# Patient Record
Sex: MALE | Race: WHITE | Employment: OTHER | URBAN - METROPOLITAN AREA
[De-identification: names, ages, dates, MRNs, and addresses within clinical notes are randomized per-mention and may not be internally consistent; named-entity substitution may affect disease eponyms.]

---

## 2017-06-16 ENCOUNTER — HOSPITAL ENCOUNTER (OUTPATIENT)
Dept: PREADMISSION TESTING | Age: 72
Discharge: HOME | End: 2017-06-16
Payer: MEDICARE

## 2017-06-16 DIAGNOSIS — S83.242A ACUTE MEDIAL MENISCUS TEAR OF LEFT KNEE: ICD-10-CM

## 2017-06-16 LAB
ALBUMIN SERPL BCP-MCNC: 4.1 G/DL (ref 3.4–5)
ALBUMIN/GLOB SERPL: 1.1 {RATIO} (ref 0.8–1.7)
ALP SERPL-CCNC: 73 U/L (ref 45–117)
ALT SERPL-CCNC: 21 U/L (ref 16–61)
ANION GAP BLD CALC-SCNC: 10 MMOL/L (ref 3–18)
AST SERPL W P-5'-P-CCNC: 22 U/L (ref 15–37)
BASOPHILS # BLD AUTO: 0 K/UL (ref 0–0.06)
BASOPHILS # BLD: 0 % (ref 0–2)
BILIRUB SERPL-MCNC: 0.6 MG/DL (ref 0.2–1)
BUN SERPL-MCNC: 14 MG/DL (ref 7–18)
BUN/CREAT SERPL: 16 (ref 12–20)
CALCIUM SERPL-MCNC: 8.7 MG/DL (ref 8.5–10.1)
CHLORIDE SERPL-SCNC: 102 MMOL/L (ref 100–108)
CO2 SERPL-SCNC: 28 MMOL/L (ref 21–32)
CREAT SERPL-MCNC: 0.89 MG/DL (ref 0.6–1.3)
DIFFERENTIAL METHOD BLD: ABNORMAL
EOSINOPHIL # BLD: 0.1 K/UL (ref 0–0.4)
EOSINOPHIL NFR BLD: 3 % (ref 0–5)
ERYTHROCYTE [DISTWIDTH] IN BLOOD BY AUTOMATED COUNT: 13.4 % (ref 11.6–14.5)
GLOBULIN SER CALC-MCNC: 3.6 G/DL (ref 2–4)
GLUCOSE SERPL-MCNC: 98 MG/DL (ref 74–99)
HCT VFR BLD AUTO: 39.4 % (ref 36–48)
HGB BLD-MCNC: 13.2 G/DL (ref 13–16)
LYMPHOCYTES # BLD AUTO: 26 % (ref 21–52)
LYMPHOCYTES # BLD: 1.3 K/UL (ref 0.9–3.6)
MCH RBC QN AUTO: 30.8 PG (ref 24–34)
MCHC RBC AUTO-ENTMCNC: 33.5 G/DL (ref 31–37)
MCV RBC AUTO: 92.1 FL (ref 74–97)
MONOCYTES # BLD: 0.7 K/UL (ref 0.05–1.2)
MONOCYTES NFR BLD AUTO: 15 % (ref 3–10)
NEUTS SEG # BLD: 2.8 K/UL (ref 1.8–8)
NEUTS SEG NFR BLD AUTO: 56 % (ref 40–73)
PLATELET # BLD AUTO: 208 K/UL (ref 135–420)
PMV BLD AUTO: 9.7 FL (ref 9.2–11.8)
POTASSIUM SERPL-SCNC: 4.2 MMOL/L (ref 3.5–5.5)
PROT SERPL-MCNC: 7.7 G/DL (ref 6.4–8.2)
RBC # BLD AUTO: 4.28 M/UL (ref 4.7–5.5)
SODIUM SERPL-SCNC: 140 MMOL/L (ref 136–145)
WBC # BLD AUTO: 5 K/UL (ref 4.6–13.2)

## 2017-06-16 PROCEDURE — 85025 COMPLETE CBC W/AUTO DIFF WBC: CPT | Performed by: ORTHOPAEDIC SURGERY

## 2017-06-16 PROCEDURE — 36415 COLL VENOUS BLD VENIPUNCTURE: CPT | Performed by: ORTHOPAEDIC SURGERY

## 2017-06-16 PROCEDURE — 80053 COMPREHEN METABOLIC PANEL: CPT | Performed by: ORTHOPAEDIC SURGERY

## 2017-06-16 PROCEDURE — 93005 ELECTROCARDIOGRAM TRACING: CPT

## 2017-06-17 LAB
ATRIAL RATE: 49 BPM
CALCULATED P AXIS, ECG09: 45 DEGREES
CALCULATED R AXIS, ECG10: 15 DEGREES
CALCULATED T AXIS, ECG11: 10 DEGREES
DIAGNOSIS, 93000: NORMAL
P-R INTERVAL, ECG05: 158 MS
Q-T INTERVAL, ECG07: 456 MS
QRS DURATION, ECG06: 106 MS
QTC CALCULATION (BEZET), ECG08: 411 MS
VENTRICULAR RATE, ECG03: 49 BPM

## 2017-07-19 ENCOUNTER — TELEPHONE (OUTPATIENT)
Dept: HEMATOLOGY | Age: 72
End: 2017-07-19

## 2017-07-19 NOTE — TELEPHONE ENCOUNTER
Informed Amanda Polo that I will send a message to Dr. Celso Arteaga and his nurse please give her a call back at earliest convenience to [phone].

## 2017-08-01 DIAGNOSIS — Q44.4 CHOLEDOCHAL CYST: Primary | ICD-10-CM

## 2017-08-09 ENCOUNTER — HOSPITAL ENCOUNTER (OUTPATIENT)
Dept: NUCLEAR MEDICINE | Age: 72
Discharge: HOME | End: 2017-08-09
Attending: INTERNAL MEDICINE
Payer: MEDICARE

## 2017-08-09 ENCOUNTER — HOSPITAL ENCOUNTER (OUTPATIENT)
Dept: MRI IMAGING | Age: 72
Discharge: HOME | End: 2017-08-09
Attending: INTERNAL MEDICINE
Payer: MEDICARE

## 2017-08-09 DIAGNOSIS — Q44.4 CHOLEDOCHAL CYST: ICD-10-CM

## 2017-08-09 LAB — CREAT UR-MCNC: 0.8 MG/DL (ref 0.6–1.3)

## 2017-08-09 PROCEDURE — A9585 GADOBUTROL INJECTION: HCPCS | Performed by: INTERNAL MEDICINE

## 2017-08-09 PROCEDURE — 78226 HEPATOBILIARY SYSTEM IMAGING: CPT

## 2017-08-09 PROCEDURE — 74183 MRI ABD W/O CNTR FLWD CNTR: CPT

## 2017-08-09 PROCEDURE — 82565 ASSAY OF CREATININE: CPT

## 2017-08-09 PROCEDURE — 74011250636 HC RX REV CODE- 250/636: Performed by: INTERNAL MEDICINE

## 2017-08-09 RX ADMIN — GADOBUTROL 10 ML: 604.72 INJECTION INTRAVENOUS at 10:22

## 2017-09-05 ENCOUNTER — OFFICE VISIT (OUTPATIENT)
Dept: HEMATOLOGY | Age: 72
End: 2017-09-05

## 2017-09-05 VITALS
DIASTOLIC BLOOD PRESSURE: 71 MMHG | TEMPERATURE: 98.5 F | BODY MASS INDEX: 25.22 KG/M2 | WEIGHT: 180.13 LBS | SYSTOLIC BLOOD PRESSURE: 120 MMHG | HEART RATE: 69 BPM | HEIGHT: 71 IN | OXYGEN SATURATION: 99 %

## 2017-09-05 DIAGNOSIS — K76.89 LIVER CYST: Primary | ICD-10-CM

## 2017-09-05 RX ORDER — LANOLIN ALCOHOL/MO/W.PET/CERES
1000 CREAM (GRAM) TOPICAL DAILY
COMMUNITY

## 2017-09-05 RX ORDER — RIVASTIGMINE TARTRATE 1.5 MG/1
CAPSULE ORAL 2 TIMES DAILY WITH MEALS
COMMUNITY

## 2017-09-05 NOTE — PROGRESS NOTES
[address] Urias MD, [address], [place], [place]       Bautista Gates, JOANN Paulino, [facility]   Mike Arevalo, MICHEL Colin NP        at [address], [address] [address]     [phone]     FAX: [phone]    at [address], [id number] [place] [address], [address]     [phone]     FAX: [phone]       Patient Care Team:  Iván Sevilla PA-C as PCP - General (Physician Assistant)  Olman Keating MD (Gastroenterology)  Tova Antonio MD (Urology)      Problem List  Date Reviewed: 12/9/2017          Codes Class Noted    Liver cyst ICD-10-CM: K76.89  ICD-9-CM: 573.8  12/9/2017        Gallstones ICD-10-CM: K80.20  ICD-9-CM: 574.20  7/19/2014        BPH (benign prostatic hyperplasia) ICD-10-CM: N40.0  ICD-9-CM: 600.00  7/16/2014        Bile duct abnormality ICD-10-CM: K83.9  ICD-9-CM: 576.9  7/16/2014    Overview Signed 4/4/2015  8:56 AM by Zelda Berry MD     Liver cyst adjacent to bile duct or intrahepatic single choledococyst             Hypercholesterolemia ICD-10-CM: E78.00  ICD-9-CM: 272.0  7/16/2014        Hypertension ICD-10-CM: I10  ICD-9-CM: 401.9  7/16/2014        H/O shoulder surgery ICD-10-CM: Z98.890  ICD-9-CM: V45.89  7/16/2014              Rianna Crew returns to the [address] for management of an acute single elevation in liver enzymes and a liver cyst.  The active problem list, all pertinent past medical history, medications, endoscopic studies, radiologic findings and laboratory findings related to the liver disorder were reviewed with the patient. The patient is a 71 y.o.  male who was first noted to have an elevation in liver enzymes in 5/2014. Serologic evaluation for various causes of chronic liver disease were either normal or negative.     An MRI/MRCP of the liver was performed in 1/2015. This demonstrated a small simple cyst adjacent to a bile duct or a small single choledochocyst.  The CBD was mildly dilated. A repeat MRI in 1/2016 was identical.    The patient had another MRI in 7/2016. This demosntrated a similar appearance as was seen on the previous 2 imaging studies. A liver biopsy has not been performed and is not indicated. The most recent laboratory studies indicate that the liver transaminases are normal, ALP is normal, tests of hepatic synthetic and metabolic function are normal, and the platelet count is normal.    The patient feels well and voiced no complaints today. The patient completes all daily activities without any functional limitations. The patient has not experienced fatigue, fevers, chills, pain in the right side over the liver, nausea, arthralgias, myalgias. ALLERGIES  Allergies   Allergen Reactions    Darvon [Propoxyphene] Shortness of Breath    Morphine (Pf) Other (comments)     Lowered  blood pressure       MEDICATIONS  Current Outpatient Prescriptions   Medication Sig    rivastigmine tartrate (EXELON) 1.5 mg capsule Take  by mouth two (2) times daily (with meals).  cyanocobalamin 1,000 mcg tablet Take 1,000 mcg by mouth daily.  pitavastatin (LIVALO) 4 mg tab tablet Take 4 mg by mouth daily.  OTHER Taty 0.5/0.4mg for enlarged prostate  Indications: DISORDER OF PROSTATE    ezetimibe (ZETIA) 10 mg tablet Take  by mouth.  omeprazole (PRILOSEC) 20 mg capsule Take 20 mg by mouth daily.  ALPRAZOLAM (XANAX PO) Take  by mouth.  FOLIC ACID/MULTIVITS-MIN/LUT (CENTRUM SILVER PO) Take  by mouth.  Omega-3 Fatty Acids-Fish Oil (OMEGA 3 FISH OIL) 684-1,200 mg cpDR Take  by mouth.  coenzyme q10-vitamin e (COQ10 ) 100-100 mg-unit cap Take  by mouth.  rosuvastatin (CRESTOR) 5 mg tablet Take  by mouth nightly.  losartan (COZAAR) 50 mg tablet Take 25 mg by mouth daily.     aspirin delayed-release 81 mg tablet Take  by mouth daily. No current facility-administered medications for this visit. SYSTEM REVIEW NOT RELATED TO LIVER DISEASE OR REVIEWED ABOVE:  Constitution systems: Negative for fever, chills, weight gain, weight loss. Eyes: Negative for visual changes. ENT: Negative for sore throat, painful swallowing. Respiratory: Negative for cough, hemoptysis, SOB. Cardiology: Negative for chest pain, palpitations. GI:  Negative for constipation or diarrhea. : Negative for urinary frequency, dysuria, hematuria, nocturia. Skin: Negative for rash. Hematology: Negative for easy bruising, blood clots. Musculo-skelatal: Negative for back pain, muscle pain, weakness. Neurologic: Negative for headaches, dizziness, vertigo, memory problems not related to HE. Psychology: Negative for anxiety, depression. FAMILY HISTORY:  There is no family history of liver disease. SOCIAL HISTORY:  The patient is . The patient has 1 child. The patient stopped using tobacco products in 1975. The patient has never consumed significant amounts of alcohol. The patient currently works full time . PHYSICAL EXAMINATION:  Visit Vitals    /71    Pulse 69    Temp 98.5 °F (36.9 °C) (Tympanic)    Ht 5' 11\" (1.803 m)    Wt 180 lb 2 oz (81.7 kg)    SpO2 99%    BMI 25.12 kg/m2     General: No acute distress. Eyes: Sclera anicteric. ENT: No oral lesions. Thyroid normal.  Nodes: No adenopathy. Skin: No spider angiomata. No jaundice. No palmar erythema. Respiratory: Lungs clear to auscultation. Cardiovascular: Regular heart rate. No murmurs. No JVD. Abdomen: Soft non-tender. Liver size normal to percussion/palpation. Spleen not palpable. No obvious ascites. Extremities: No edema. No muscle wasting. No gross arthritic changes. Neurologic: Alert and oriented. Cranial nerves grossly intact. No asterixsis.     LABORATORY STUDIES:  [facility] Ref Rng 7/13/2016 7/6/2016 1/13/2016   WBC 4.6 - 13.2 K/uL   8.3   ANC 1.8 - 8.0 K/UL   5.1   HGB 13.0 - 16.0 g/dL   13.6    - 420 K/uL   228   AST 15 - 37 U/L 20  22   ALT 16 - 61 U/L 27  30   Alk Phos 45 - 117 U/L 73  82   Bili, Total 0.2 - 1.0 MG/DL 0.4  0.3   Bili, Direct 0.0 - 0.2 MG/DL   0.1   Albumin 3.4 - 5.0 g/dL 3.9  4.2   BUN 7.0 - 18 MG/DL 11  17   Creat 0.6 - 1.3 MG/DL 0.82  0.80   Creat (iSTAT) 0.6 - 1.3 MG/DL  0.9    Na 136 - 145 mmol/L 140  143   K 3.5 - 5.5 mmol/L 4.2  4.6   Cl 100 - 108 mmol/L 105  104   CO2 21 - 32 mmol/L 31  31   Glucose 74 - 99 mg/dL 70 (L)  86     SEROLOGIES:  5/2014. HBsAntigen negative, anti-HBcore negative, anti-HCV negative, Ferritin 286, iron saturation 12%, ASMA negative, AMA negative, ceruloplsmin 28, alpha-1-antitrypsin 168. LIVER HISTOLOGY:  Not available or performed    ENDOSCOPIC PROCEDURES:  Colonoscopy by Dr Nisa Horan. Results not known. RADIOLOGY:  5/2014. Ultrasound of liver. Normal appearing liver. No liver mass lesions. 6/2014. CT scan abdomen with IV contrast.  Normal appearing liver. No liver mass lesions. 1.8 cm cystic focus in left lobe possibly associated with a bile duct. Normal spleen. No ascites. Small gallstones. 1/2015. MRI of liver with MRCP. Normal appearing liver. Small liver cyst in left lobe which may communicate with the left duct or just be adjacent to it. Mild dilation of the CBD. No bile duct strictures. 1/2016. MRI of liver with MRCP. Normal appearing liver. Small liver cyst in left lobe which may communicate with the left duct or just be adjacent to it. Mild dilation of the CBD. No bile duct strictures. 7/2016. MRI of liver with MRCP. Normal appearing liver. 1.5 cm cyst in left lobe which may communicate with the left duct or just be adjacent to it. Mild dilation of the CBD. No bile duct strictures. 7/2017. MRI of liver with MRCP. Normal appearing liver.   < 1.0 cm cyst in left lobe which appears separate from the left duct. Mild dilation of the CBD. No bile duct strictures. 7/2017. HIUDA scan. No dye enters the cyst.    OTHER TESTING:  Not available or performed    ASSESSMENT AND PLAN:  Small simple Liver cyst which is separate form the bile ducts and not a choledochal cyst.      He has now had 3 MRI scans and 2 HIDA scans which show the cyst is benign and it is not concluded that the cyst is not communicating with the bile ducts. Serologic tests for causes of chronic liver disease are negative. The patient was directed to continue all current medications at the current dosages. There are no contraindications for the patient to take any medications that are necessary for treatment of other medical issues. The patient was counseled regarding alcohol consumption. The need for vaccination against viral hepatitis A and B will be assessed with serologic and instituted as appropriate. All of the above issues were discussed with the patient. All questions wee answered. The patient expressed a clear understanding of the above. No further follow-up or imaging of the liver is needed.      Tani Sands MD  Liver [place] [address], [address] [place] [address], [address] [place] [address] Street - [address]  [phone]

## 2017-12-09 PROBLEM — K76.89 LIVER CYST: Status: ACTIVE | Noted: 2017-12-09
